# Patient Record
Sex: FEMALE | Race: BLACK OR AFRICAN AMERICAN | Employment: FULL TIME | URBAN - METROPOLITAN AREA
[De-identification: names, ages, dates, MRNs, and addresses within clinical notes are randomized per-mention and may not be internally consistent; named-entity substitution may affect disease eponyms.]

---

## 2020-02-19 ENCOUNTER — HOSPITAL ENCOUNTER (OUTPATIENT)
Dept: OCCUPATIONAL MEDICINE | Age: 25
Discharge: HOME | End: 2020-02-19
Attending: EMERGENCY MEDICINE

## 2020-02-19 DIAGNOSIS — Z02.1 PHYSICAL EXAM, PRE-EMPLOYMENT: ICD-10-CM

## 2020-11-01 ENCOUNTER — HOSPITAL ENCOUNTER (EMERGENCY)
Age: 25
Discharge: HOME | End: 2020-11-01
Attending: STUDENT IN AN ORGANIZED HEALTH CARE EDUCATION/TRAINING PROGRAM
Payer: COMMERCIAL

## 2020-11-01 VITALS
HEART RATE: 89 BPM | DIASTOLIC BLOOD PRESSURE: 89 MMHG | SYSTOLIC BLOOD PRESSURE: 144 MMHG | TEMPERATURE: 98.4 F | OXYGEN SATURATION: 99 % | BODY MASS INDEX: 45.27 KG/M2 | RESPIRATION RATE: 15 BRPM | HEIGHT: 66 IN

## 2020-11-01 DIAGNOSIS — R51.9 ACUTE NONINTRACTABLE HEADACHE, UNSPECIFIED HEADACHE TYPE: Primary | ICD-10-CM

## 2020-11-01 DIAGNOSIS — N92.6 IRREGULAR MENSES: ICD-10-CM

## 2020-11-01 LAB
ANION GAP SERPL CALC-SCNC: 2 MMOL/L (ref 3–18)
APPEARANCE UR: CLEAR
BACTERIA URNS QL MICRO: ABNORMAL /HPF
BASOPHILS # BLD: 0 K/UL (ref 0–0.1)
BASOPHILS NFR BLD: 1 % (ref 0–2)
BILIRUB UR QL: NEGATIVE
BUN SERPL-MCNC: 9 MG/DL (ref 7–18)
BUN/CREAT SERPL: 14 (ref 12–20)
CALCIUM SERPL-MCNC: 9.1 MG/DL (ref 8.5–10.1)
CHLORIDE SERPL-SCNC: 107 MMOL/L (ref 100–111)
CO2 SERPL-SCNC: 28 MMOL/L (ref 21–32)
COLOR UR: YELLOW
CREAT SERPL-MCNC: 0.64 MG/DL (ref 0.6–1.3)
DIFFERENTIAL METHOD BLD: ABNORMAL
EOSINOPHIL # BLD: 0.1 K/UL (ref 0–0.4)
EOSINOPHIL NFR BLD: 2 % (ref 0–5)
EPITH CASTS URNS QL MICRO: ABNORMAL /LPF (ref 0–5)
ERYTHROCYTE [DISTWIDTH] IN BLOOD BY AUTOMATED COUNT: 16.3 % (ref 11.6–14.5)
GLUCOSE SERPL-MCNC: 93 MG/DL (ref 74–99)
GLUCOSE UR STRIP.AUTO-MCNC: NEGATIVE MG/DL
HCG SERPL QL: NEGATIVE
HCG UR QL: NEGATIVE
HCT VFR BLD AUTO: 32.8 % (ref 35–45)
HGB BLD-MCNC: 9.3 G/DL (ref 12–16)
HGB UR QL STRIP: NEGATIVE
KETONES UR QL STRIP.AUTO: NEGATIVE MG/DL
LEUKOCYTE ESTERASE UR QL STRIP.AUTO: ABNORMAL
LYMPHOCYTES # BLD: 2.4 K/UL (ref 0.9–3.6)
LYMPHOCYTES NFR BLD: 39 % (ref 21–52)
MCH RBC QN AUTO: 20.5 PG (ref 24–34)
MCHC RBC AUTO-ENTMCNC: 28.4 G/DL (ref 31–37)
MCV RBC AUTO: 72.4 FL (ref 74–97)
MONOCYTES # BLD: 0.3 K/UL (ref 0.05–1.2)
MONOCYTES NFR BLD: 5 % (ref 3–10)
NEUTS SEG # BLD: 3.2 K/UL (ref 1.8–8)
NEUTS SEG NFR BLD: 53 % (ref 40–73)
NITRITE UR QL STRIP.AUTO: NEGATIVE
PH UR STRIP: 6 [PH] (ref 5–8)
PLATELET # BLD AUTO: 411 K/UL (ref 135–420)
PMV BLD AUTO: 9.3 FL (ref 9.2–11.8)
POTASSIUM SERPL-SCNC: 3.6 MMOL/L (ref 3.5–5.5)
PROT UR STRIP-MCNC: NEGATIVE MG/DL
RBC # BLD AUTO: 4.53 M/UL (ref 4.2–5.3)
RBC #/AREA URNS HPF: NEGATIVE /HPF (ref 0–5)
SODIUM SERPL-SCNC: 137 MMOL/L (ref 136–145)
SP GR UR REFRACTOMETRY: 1.02 (ref 1–1.03)
UROBILINOGEN UR QL STRIP.AUTO: 0.2 EU/DL (ref 0.2–1)
WBC # BLD AUTO: 6.1 K/UL (ref 4.6–13.2)
WBC URNS QL MICRO: ABNORMAL /HPF (ref 0–4)

## 2020-11-01 PROCEDURE — 84703 CHORIONIC GONADOTROPIN ASSAY: CPT

## 2020-11-01 PROCEDURE — 74011250636 HC RX REV CODE- 250/636: Performed by: STUDENT IN AN ORGANIZED HEALTH CARE EDUCATION/TRAINING PROGRAM

## 2020-11-01 PROCEDURE — 80048 BASIC METABOLIC PNL TOTAL CA: CPT

## 2020-11-01 PROCEDURE — 81025 URINE PREGNANCY TEST: CPT

## 2020-11-01 PROCEDURE — 96374 THER/PROPH/DIAG INJ IV PUSH: CPT

## 2020-11-01 PROCEDURE — 81001 URINALYSIS AUTO W/SCOPE: CPT

## 2020-11-01 PROCEDURE — 99284 EMERGENCY DEPT VISIT MOD MDM: CPT

## 2020-11-01 PROCEDURE — 85025 COMPLETE CBC W/AUTO DIFF WBC: CPT

## 2020-11-01 PROCEDURE — 74011250637 HC RX REV CODE- 250/637: Performed by: STUDENT IN AN ORGANIZED HEALTH CARE EDUCATION/TRAINING PROGRAM

## 2020-11-01 RX ORDER — ACETAMINOPHEN 325 MG/1
975 TABLET ORAL
Status: COMPLETED | OUTPATIENT
Start: 2020-11-01 | End: 2020-11-01

## 2020-11-01 RX ORDER — KETOROLAC TROMETHAMINE 15 MG/ML
15 INJECTION, SOLUTION INTRAMUSCULAR; INTRAVENOUS ONCE
Status: DISCONTINUED | OUTPATIENT
Start: 2020-11-01 | End: 2020-11-01

## 2020-11-01 RX ORDER — ONDANSETRON 2 MG/ML
4 INJECTION INTRAMUSCULAR; INTRAVENOUS
Status: COMPLETED | OUTPATIENT
Start: 2020-11-01 | End: 2020-11-01

## 2020-11-01 RX ADMIN — ONDANSETRON 4 MG: 2 INJECTION INTRAMUSCULAR; INTRAVENOUS at 21:47

## 2020-11-01 RX ADMIN — SODIUM CHLORIDE 1000 ML: 900 INJECTION, SOLUTION INTRAVENOUS at 21:47

## 2020-11-01 RX ADMIN — ACETAMINOPHEN 975 MG: 325 TABLET, FILM COATED ORAL at 21:47

## 2020-11-01 NOTE — LETTER
[facility] EMERGENCY DEPT 
[address] 
[address] [facility] [id number] [phone] Work/School Note Date: 11/1/2020 To Whom It May concern: 
 
 
Razia Silverio was seen and treated today in the emergency room by the following provider(s): 
Attending Provider: Kerrie Caban DO. Razia Silverio is excused from work/school on 11/01/20. She is clear to return to work/school on 11/02/20.    
 
 
Sincerely, 
 
 
 
 
Demian Bergeron DO

## 2020-11-02 NOTE — ED PROVIDER NOTES
82-year-old female with past medical history significant for iron deficiency anemia, ovarian cyst and asthma presents to the ED for complaint of intermittent frontal headache for the past 2 days. It started while she was at work and gradually worsened lasted approximately 2 hours and then resolved on its own. She took some Tylenol yesterday that helped with the pain. She denies any head trauma, visual changes, fever, chills, chest pain, shortness of breath or abdominal pain. She does endorse some nausea but no vomiting. She is also concerned that she may be pregnant because she has not had a menstrual period since August of this year. She received a Depo shot in May and has had irregular menses since then. She is sexually active with one partner and does not use protection. She denies any dysuria, increased urinary frequency, urgency, abnormal vaginal discharge or any other complaints. She denies any smoking, drinking or recreational drug use. Past Medical History:   Diagnosis Date    Anemia     Asthma        History reviewed. No pertinent surgical history. History reviewed. No pertinent family history.     Social History     Socioeconomic History    Marital status: SINGLE     Spouse name: Not on file    Number of children: Not on file    Years of education: Not on file    Highest education level: Not on file   Occupational History    Not on file   Social Needs    Financial resource strain: Not on file    Food insecurity     Worry: Not on file     Inability: Not on file    Transportation needs     Medical: Not on file     Non-medical: Not on file   Tobacco Use    Smoking status: Never Smoker    Smokeless tobacco: Current User   Substance and Sexual Activity    Alcohol use: Yes     Frequency: Never     Comment: occassional     Drug use: Never    Sexual activity: Not on file   Lifestyle    Physical activity     Days per week: Not on file     Minutes per session: Not on file    Stress: Not on file   Relationships    Social connections     Talks on phone: Not on file     Gets together: Not on file     Attends Quaker service: Not on file     Active member of club or organization: Not on file     Attends meetings of clubs or organizations: Not on file     Relationship status: Not on file    Intimate partner violence     Fear of current or ex partner: Not on file     Emotionally abused: Not on file     Physically abused: Not on file     Forced sexual activity: Not on file   Other Topics Concern    Not on file   Social History Narrative    Not on file         ALLERGIES: Patient has no known allergies. Review of Systems   Constitutional: Negative for activity change and appetite change. HENT: Negative for drooling and facial swelling. Eyes: Negative for pain and discharge. Respiratory: Negative for apnea and choking. Cardiovascular: Negative for palpitations and leg swelling. Gastrointestinal: Positive for nausea. Negative for blood in stool and rectal pain. Endocrine: Negative for polydipsia and polyphagia. Genitourinary: Positive for menstrual problem. Negative for genital sores and hematuria. Musculoskeletal: Negative for gait problem and neck stiffness. Skin: Negative for color change and rash. Allergic/Immunologic: Negative for environmental allergies. Neurological: Positive for headaches. Negative for tremors. Hematological: Negative for adenopathy. Psychiatric/Behavioral: Negative for agitation and behavioral problems. Vitals:    11/01/20 2047   BP: (!) 144/89   Pulse: 89   Resp: 15   Temp: 98.4 °F (36.9 °C)   SpO2: 99%   Height: 5' 6\" (1.676 m)            Physical Exam  Vitals signs and nursing note reviewed. Constitutional:       Appearance: She is obese. HENT:      Head: Normocephalic and atraumatic. Eyes:      Extraocular Movements: Extraocular movements intact. Pupils: Pupils are equal, round, and reactive to light. Cardiovascular:      Rate and Rhythm: Normal rate and regular rhythm. Heart sounds: Normal heart sounds. No murmur. No friction rub. Pulmonary:      Effort: Pulmonary effort is normal.      Breath sounds: Normal breath sounds. Abdominal:      Palpations: Abdomen is soft. Tenderness: There is no abdominal tenderness. There is no right CVA tenderness or left CVA tenderness. Musculoskeletal: Normal range of motion. Skin:     General: Skin is warm and dry. Capillary Refill: Capillary refill takes less than 2 seconds. Neurological:      General: No focal deficit present. Mental Status: She is alert and oriented to person, place, and time. Comments: CN II-XII intact, normal finger-to-nose, no pronator drift, negative Romberg sign, strength 5/5 and sensation intact in all extremities, ambulatory with a steady gait   Psychiatric:         Mood and Affect: Mood normal.          MDM  Number of Diagnoses or Management Options  Acute nonintractable headache, unspecified headache type:   Irregular menses:   Diagnosis management comments: 47-year-old female here with mild headache and irregular menses. Her neurological exam is completely normal and she denies any history of head trauma. No indication to pursue neuroimaging at this time. Will perform basic laboratory work-up including UA and pregnancy test.  IV fluid bolus. Tylenol for pain control. Zofran for nausea. Will reassess. Laboratory work-up is significant for anemia with a hemoglobin of 9.3. This is chronic and unchanged from baseline. UA negative for UTI. Pregnancy test negative. Patient's headache has completely resolved. Patient states that she recently moved to [place] and needs a gynecologist that she can follow-up with. Information for the OB/GYN clinic at [facility] written on her discharge paperwork. She is stable for discharge home. Pt has been reexamined.  Patient has no new complaints, changes, or physical findings. Care plan outlined and precautions discussed. Results were reviewed with the patient. All medications were reviewed with the patient; will d/c home with PMD f/u. All of pt's questions and concerns were addressed. Patient was instructed and agrees to follow up with PMD, as well as to return to the ED upon further deterioration. Patient is ready to go home. This note was dictated utilizing voice recognition software which may lead to typographical errors.  I apologize in advance if the situation occurs.  If questions arise please do not hesitate to contact me or call our department.     Dahiana Hammer, DO           Procedures

## 2020-11-02 NOTE — ED TRIAGE NOTES
Ambulatory to triage. C/o blood pressure of 142/84 at work that she was concerned about. Also states she hasn't had a period since August and has not taken an at home pregnancy test. \"I have low hormone levels and my pregnancies were never detected. \" Nausea x6 weeks.

## 2020-11-02 NOTE — ED NOTES
Medicated per mar order for pain and nausea. Fluids started through IV in RAC infusing without complications. Call bell within reach.

## 2021-03-28 ENCOUNTER — HOSPITAL ENCOUNTER (EMERGENCY)
Age: 26
Discharge: HOME | End: 2021-03-28
Attending: EMERGENCY MEDICINE
Payer: COMMERCIAL

## 2021-03-28 VITALS
DIASTOLIC BLOOD PRESSURE: 68 MMHG | HEART RATE: 81 BPM | SYSTOLIC BLOOD PRESSURE: 121 MMHG | OXYGEN SATURATION: 99 % | TEMPERATURE: 98.1 F | RESPIRATION RATE: 18 BRPM

## 2021-03-28 DIAGNOSIS — R13.10 ODYNOPHAGIA: ICD-10-CM

## 2021-03-28 DIAGNOSIS — T78.40XA ALLERGIC REACTION, INITIAL ENCOUNTER: Primary | ICD-10-CM

## 2021-03-28 LAB
ALBUMIN SERPL-MCNC: 3.8 G/DL (ref 3.4–5)
ALBUMIN/GLOB SERPL: 0.9 {RATIO} (ref 0.8–1.7)
ALP SERPL-CCNC: 139 U/L (ref 45–117)
ALT SERPL-CCNC: 44 U/L (ref 13–56)
ANION GAP SERPL CALC-SCNC: 4 MMOL/L (ref 3–18)
AST SERPL-CCNC: 30 U/L (ref 10–38)
BASOPHILS # BLD: 0 K/UL (ref 0–0.1)
BASOPHILS NFR BLD: 0 % (ref 0–2)
BILIRUB SERPL-MCNC: 0.5 MG/DL (ref 0.2–1)
BUN SERPL-MCNC: 11 MG/DL (ref 7–18)
BUN/CREAT SERPL: 19 (ref 12–20)
CALCIUM SERPL-MCNC: 9.1 MG/DL (ref 8.5–10.1)
CHLORIDE SERPL-SCNC: 104 MMOL/L (ref 100–111)
CO2 SERPL-SCNC: 27 MMOL/L (ref 21–32)
CREAT SERPL-MCNC: 0.57 MG/DL (ref 0.6–1.3)
DIFFERENTIAL METHOD BLD: ABNORMAL
EOSINOPHIL # BLD: 0.2 K/UL (ref 0–0.4)
EOSINOPHIL NFR BLD: 2 % (ref 0–5)
ERYTHROCYTE [DISTWIDTH] IN BLOOD BY AUTOMATED COUNT: 15 % (ref 11.6–14.5)
GLOBULIN SER CALC-MCNC: 4.4 G/DL (ref 2–4)
GLUCOSE SERPL-MCNC: 97 MG/DL (ref 74–99)
HCG SERPL QL: NEGATIVE
HCT VFR BLD AUTO: 35.9 % (ref 35–45)
HGB BLD-MCNC: 10.2 G/DL (ref 12–16)
LYMPHOCYTES # BLD: 2.3 K/UL (ref 0.9–3.6)
LYMPHOCYTES NFR BLD: 30 % (ref 21–52)
MCH RBC QN AUTO: 22.2 PG (ref 24–34)
MCHC RBC AUTO-ENTMCNC: 28.4 G/DL (ref 31–37)
MCV RBC AUTO: 78.2 FL (ref 74–97)
MONOCYTES # BLD: 0.5 K/UL (ref 0.05–1.2)
MONOCYTES NFR BLD: 7 % (ref 3–10)
NEUTS SEG # BLD: 4.6 K/UL (ref 1.8–8)
NEUTS SEG NFR BLD: 61 % (ref 40–73)
PLATELET # BLD AUTO: 392 K/UL (ref 135–420)
PMV BLD AUTO: 9.6 FL (ref 9.2–11.8)
POTASSIUM SERPL-SCNC: 4.1 MMOL/L (ref 3.5–5.5)
PROT SERPL-MCNC: 8.2 G/DL (ref 6.4–8.2)
RBC # BLD AUTO: 4.59 M/UL (ref 4.2–5.3)
SODIUM SERPL-SCNC: 135 MMOL/L (ref 136–145)
WBC # BLD AUTO: 7.6 K/UL (ref 4.6–13.2)

## 2021-03-28 PROCEDURE — 84703 CHORIONIC GONADOTROPIN ASSAY: CPT

## 2021-03-28 PROCEDURE — 99284 EMERGENCY DEPT VISIT MOD MDM: CPT

## 2021-03-28 PROCEDURE — 74011250636 HC RX REV CODE- 250/636: Performed by: EMERGENCY MEDICINE

## 2021-03-28 PROCEDURE — 85025 COMPLETE CBC W/AUTO DIFF WBC: CPT

## 2021-03-28 PROCEDURE — 96375 TX/PRO/DX INJ NEW DRUG ADDON: CPT

## 2021-03-28 PROCEDURE — 96374 THER/PROPH/DIAG INJ IV PUSH: CPT

## 2021-03-28 PROCEDURE — 80053 COMPREHEN METABOLIC PANEL: CPT

## 2021-03-28 RX ORDER — DIPHENHYDRAMINE HYDROCHLORIDE 50 MG/ML
50 INJECTION, SOLUTION INTRAMUSCULAR; INTRAVENOUS
Status: COMPLETED | OUTPATIENT
Start: 2021-03-28 | End: 2021-03-28

## 2021-03-28 RX ORDER — FAMOTIDINE 10 MG/ML
20 INJECTION INTRAVENOUS
Status: COMPLETED | OUTPATIENT
Start: 2021-03-28 | End: 2021-03-28

## 2021-03-28 RX ORDER — PREDNISONE 20 MG/1
40 TABLET ORAL DAILY
Qty: 8 TAB | Refills: 0 | Status: SHIPPED | OUTPATIENT
Start: 2021-03-28 | End: 2021-04-01

## 2021-03-28 RX ORDER — LORATADINE 10 MG
10 TABLET,DISINTEGRATING ORAL
Qty: 10 TAB | Refills: 0 | Status: SHIPPED | OUTPATIENT
Start: 2021-03-28 | End: 2021-04-07

## 2021-03-28 RX ORDER — EPINEPHRINE 0.3 MG/.3ML
0.3 INJECTION SUBCUTANEOUS
Qty: 1 SYRINGE | Refills: 0 | Status: SHIPPED | OUTPATIENT
Start: 2021-03-28 | End: 2021-03-28

## 2021-03-28 RX ADMIN — METHYLPREDNISOLONE SODIUM SUCCINATE 125 MG: 125 INJECTION, POWDER, FOR SOLUTION INTRAMUSCULAR; INTRAVENOUS at 09:50

## 2021-03-28 RX ADMIN — FAMOTIDINE 20 MG: 10 INJECTION, SOLUTION INTRAVENOUS at 09:50

## 2021-03-28 RX ADMIN — DIPHENHYDRAMINE HYDROCHLORIDE 50 MG: 50 INJECTION, SOLUTION INTRAMUSCULAR; INTRAVENOUS at 09:50

## 2021-03-28 RX ADMIN — SODIUM CHLORIDE 1000 ML: 9 INJECTION, SOLUTION INTRAVENOUS at 09:50

## 2021-03-28 NOTE — Clinical Note
[facility] EMERGENCY DEPT 
[address] 
300 [facility] [id number] [phone] Work/School Note Date: 3/28/2021 To Whom It May concern: 
 
Dickie Goldmann was seen and treated today in the emergency room by the following provider(s): 
Attending Provider: Vianey Alvarado DO. Dickie Goldmann is excused from work/school on 03/28/21 and 03/29/21. She is medically clear to return to work/school on 3/30/2021.   
 
 
Sincerely, 
 
 
 
 
Nikolasy Abler, DO

## 2021-03-28 NOTE — DISCHARGE INSTRUCTIONS
Take the prednisone until it is gone. Take the Claritin once per day as needed. You may also take Benadryl however may cause drowsiness. I have given you primary care resources as well as resources for an allergist.    If your symptoms worsen or you have any increased swelling you can use the EpiPen however you must seek medical attention right away. If your symptoms worsen or change in any way return or seek medical attention.

## 2021-03-28 NOTE — ED TRIAGE NOTES
Pt reports a known allergy to peanuts and states she accidentally took a bite of a protein bar 3 days ago that had peanut butter in it. Pt states she started to fell shortness of breath and difficulty swallowing about 1 hour later that has swati getting progressively worse over the last 3 days.

## 2021-03-28 NOTE — ED PROVIDER NOTES
EMERGENCY DEPARTMENT HISTORY AND PHYSICAL EXAM    9:23 AM    Date: 3/28/2021  Patient Name: Bella Ford    History of Presenting Illness     Chief Complaint   Patient presents with    Allergic Reaction       History Provided By: Patient  Location/Duration/Severity/Modifying factors   Patient is a 29-year-old female who presents to the emergency department with a chief complaint of difficulty swallowing after she ate peanuts. Patient reports that 2 days ago she had peanuts, she has a known allergy to peanuts, shortly thereafter she had nasal congestion and pain in her throat difficulty swallowing. She reports that it worsened this morning and she opted to come be seen. She is taking several different allergy medicines. Reports he is at difficulty swallowing liquids or anything however she did note that she did take some allergy medicine orally this morning. Previously had an EpiPen but does not have one currently. No fevers or chills, history of asthma but breathing feels like it is at baseline at this time. Denies any headache. Unknown if pregnant. Nothing really seems to make her symptoms better or worse. Patient reports she last took her allergy medicine about 4 hours ago. No rash that she is aware of. Symptoms originally onset about an hour after eating the peanuts. PCP: None    Current Outpatient Medications   Medication Sig Dispense Refill    EPINEPHrine (EPIPEN) 0.3 mg/0.3 mL injection 0.3 mL by IntraMUSCular route once as needed for Anaphylaxis for up to 1 dose. 1 Syringe 0    predniSONE (DELTASONE) 20 mg tablet Take 40 mg by mouth daily for 4 days. With Breakfast 8 Tab 0    loratadine (CLARITIN REDITABS) 10 mg dissolvable tablet Take 1 Tab by mouth daily as needed for Allergies for up to 10 days.  10 Tab 0       Past History     Past Medical History:  Past Medical History:   Diagnosis Date    Anemia     Asthma        Past Surgical History:  No past surgical history on file.    Family History:  No family history on file. Social History:  Social History     Tobacco Use    Smoking status: Never Smoker    Smokeless tobacco: Current User   Substance Use Topics    Alcohol use: Yes     Frequency: Never     Comment: occassional     Drug use: Never       Allergies:  No Known Allergies    I reviewed and confirmed the above information with patient and updated as necessary. Review of Systems     Review of Systems   Constitutional: Negative for chills and fever. HENT: Positive for sore throat and trouble swallowing. Negative for congestion, rhinorrhea, sinus pressure and sneezing. Eyes: Positive for itching. Negative for visual disturbance. Respiratory: Negative for cough and shortness of breath. Cardiovascular: Negative for chest pain. Gastrointestinal: Negative for abdominal pain, diarrhea, nausea and vomiting. Genitourinary: Negative for dysuria, frequency and urgency. Musculoskeletal: Negative for back pain and neck pain. Skin: Negative for rash. Neurological: Negative for syncope, numbness and headaches. Physical Exam     Visit Vitals  /68   Pulse 81   Temp 98.1 °F (36.7 °C)   Resp 18   SpO2 99%       Physical Exam  Vitals signs and nursing note reviewed. Constitutional:       General: She is not in acute distress. Appearance: Normal appearance. She is normal weight. She is not ill-appearing or toxic-appearing. HENT:      Head: Normocephalic and atraumatic. Right Ear: External ear normal.      Left Ear: External ear normal.      Nose: Congestion present. Mouth/Throat:      Mouth: Mucous membranes are moist.      Comments: Mild pharyngeal erythema, borderline uvula swelling. No tonsillar deviation or peritonsillar abscess. No trismus, swallowing secretions without difficulty. Eyes:      Conjunctiva/sclera: Conjunctivae normal.      Pupils: Pupils are equal, round, and reactive to light.    Neck:      Musculoskeletal: Normal range of motion and neck supple. Cardiovascular:      Rate and Rhythm: Normal rate and regular rhythm. Pulses: Normal pulses. Heart sounds: Normal heart sounds. No murmur. Pulmonary:      Effort: Pulmonary effort is normal.      Breath sounds: Normal breath sounds. No wheezing, rhonchi or rales. Abdominal:      General: Abdomen is flat. Tenderness: There is no abdominal tenderness. There is no guarding or rebound. Musculoskeletal: Normal range of motion. General: No swelling or tenderness. Right lower leg: No edema. Left lower leg: No edema. Skin:     General: Skin is warm and dry. Capillary Refill: Capillary refill takes less than 2 seconds. Findings: No rash. Neurological:      General: No focal deficit present. Mental Status: She is alert. Diagnostic Study Results     Labs -  Recent Results (from the past 24 hour(s))   CBC WITH AUTOMATED DIFF    Collection Time: 03/28/21  9:25 AM   Result Value Ref Range    WBC 7.6 4.6 - 13.2 K/uL    RBC 4.59 4.20 - 5.30 M/uL    HGB 10.2 (L) 12.0 - 16.0 g/dL    HCT 35.9 35.0 - 45.0 %    MCV 78.2 74.0 - 97.0 FL    MCH 22.2 (L) 24.0 - 34.0 PG    MCHC 28.4 (L) 31.0 - 37.0 g/dL    RDW 15.0 (H) 11.6 - 14.5 %    PLATELET 306 485 - 651 K/uL    MPV 9.6 9.2 - 11.8 FL    NEUTROPHILS 61 40 - 73 %    LYMPHOCYTES 30 21 - 52 %    MONOCYTES 7 3 - 10 %    EOSINOPHILS 2 0 - 5 %    BASOPHILS 0 0 - 2 %    ABS. NEUTROPHILS 4.6 1.8 - 8.0 K/UL    ABS. LYMPHOCYTES 2.3 0.9 - 3.6 K/UL    ABS. MONOCYTES 0.5 0.05 - 1.2 K/UL    ABS. EOSINOPHILS 0.2 0.0 - 0.4 K/UL    ABS.  BASOPHILS 0.0 0.0 - 0.1 K/UL    DF AUTOMATED     METABOLIC PANEL, COMPREHENSIVE    Collection Time: 03/28/21  9:25 AM   Result Value Ref Range    Sodium 135 (L) 136 - 145 mmol/L    Potassium 4.1 3.5 - 5.5 mmol/L    Chloride 104 100 - 111 mmol/L    CO2 27 21 - 32 mmol/L    Anion gap 4 3.0 - 18 mmol/L    Glucose 97 74 - 99 mg/dL    BUN 11 7.0 - 18 MG/DL    Creatinine 0.57 (L) 0.6 - 1.3 MG/DL    BUN/Creatinine ratio 19 12 - 20      GFR est AA >60 >60 ml/min/1.73m2    GFR est non-AA >60 >60 ml/min/1.73m2    Calcium 9.1 8.5 - 10.1 MG/DL    Bilirubin, total 0.5 0.2 - 1.0 MG/DL    ALT (SGPT) 44 13 - 56 U/L    AST (SGOT) 30 10 - 38 U/L    Alk. phosphatase 139 (H) 45 - 117 U/L    Protein, total 8.2 6.4 - 8.2 g/dL    Albumin 3.8 3.4 - 5.0 g/dL    Globulin 4.4 (H) 2.0 - 4.0 g/dL    A-G Ratio 0.9 0.8 - 1.7     HCG QL SERUM    Collection Time: 03/28/21  9:25 AM   Result Value Ref Range    HCG, Ql. Negative NEG           Radiologic Studies -   No orders to display           Medical Decision Making   I am the first provider for this patient. I reviewed the vital signs, available nursing notes, past medical history, past surgical history, family history and social history. Vital Signs-Reviewed the patient's vital signs. Records Reviewed: Nursing Notes and Old Medical Records (Time of Review: 9:23 AM)    ED Course: Progress Notes, Reevaluation, and Consults:  ED Course as of Mar 28 1238   Sun Mar 28, 2021   1221 Patient reassessed. Resting comfortably. She is able to tolerate oral fluids in my presence. Offered CT imaging now to rule out abscess or other airway obstruction however she has no stridor, has no drooling or trismus. She prefers trial of prednisone. We will also give her EpiPen. She reports that this is similar to prior episodes of reaction to peanut butter where she has more delayed response. She tells me a story of when she tried peanut butter chocolate candy that resulted in 3 or 4 days of similar symptoms.     [NIYAH]      ED Course User Index  [NIYAH] Irma Stokes DO         Provider Notes (Medical Decision Making):   MDM  Number of Diagnoses or Management Options  Allergic reaction, initial encounter  Odynophagia  Diagnosis management comments: Patient is a 77-year-old female with known peanut allergy and asthma who presents with a chief complaint difficulty swallowing 2 days after exposure to peanuts. Patient reports mild reaction at first and then has seemingly had slow progression over the past few days. Differential includes angioedema, anaphylaxis, allergic reaction, also consider primary pharyngitis, peritonsillar abscess, retropharyngeal abscess, etc.    We will initially treat her like an allergic reaction with steroids, Benadryl and Pepcid. Certainly if she worsens or does not improve as expected may need to give her epinephrine, if she has no improvement with this management will need to look for other causes and probably do a CT. Results reviewed: Patient demonstrated steady improvement during her course. She is able to tolerate oral fluids. She has no trismus, no drooling. Voice actually improved as well and she is less congested. Suspect allergic etiology. Offered CT given she was 900% at baseline she declines wants to do steroids at home. Advised return if symptoms are worsening or changing in the meantime. At this time, patient is stable and appropriate for discharge home.  Patient demonstrates understanding of current diagnoses and is in agreement with the treatment plan. Shalini Negreteon are advised that while the likelihood of serious underlying condition is low at this point given the evaluation performed today, we cannot fully rule it out. Karron Maxx are advised to immediately return with any new symptoms or worsening of current condition.  All questions have been answered. Debbie Cline is given educational material regarding their diagnoses, including danger symptoms and when to return to the ED. This note was dictated utilizing Dragon voice recognition software. Unfortunately this leads to occasional typographical errors. I apologize in advance if the situation occurs. If questions occur please do not hesitate to contact me directly. DO Panda Marshall        Diagnosis     Clinical Impression:   1. Allergic reaction, initial encounter    2. Odynophagia        Disposition: Discharge    Follow-up Information     Follow up With Specialties Details Why Contact Info    [facility] EMERGENCY DEPT Emergency Medicine  If symptoms worsen, As needed [address] [facility] [address]    HR ALLERGY Allergy   [address] [facility] [address]  [place] [address]  [address] [facility]. Jeyson Rice MD Allergy   [address] [facility] [address] [facility]  [phone]      Your Primary Care Physician  In 3 days      Wander Damon MD Geriatric Medicine In 3 days [facility] [address]  [address] [facility] [address] [place] Diane MD Internal Medicine In 3 days  [facility] [address] [place] [address] [facility] Internal Medicine [address] [facility] [address] [phone]             Patient's Medications   Start Taking    EPINEPHRINE (EPIPEN) 0.3 MG/0.3 ML INJECTION    0.3 mL by IntraMUSCular route once as needed for Anaphylaxis for up to 1 dose. LORATADINE (CLARITIN REDITABS) 10 MG DISSOLVABLE TABLET    Take 1 Tab by mouth daily as needed for Allergies for up to 10 days. PREDNISONE (DELTASONE) 20 MG TABLET    Take 40 mg by mouth daily for 4 days. With Breakfast   Continue Taking    No medications on file   These Medications have changed    No medications on file   Stop Taking    No medications on file       Fadia Zazueta DO   Emergency Medicine   March 28, 2021, 9:23 AM     This note is dictated utilizing Dragon voice recognition software. Unfortunately this leads to occasional typographical errors using the voice recognition. I apologize in advance if the situation occurs. If questions occur please do not hesitate to contact me directly.     Fadia Zazueta, DO

## 2021-07-14 NOTE — ED NOTES
Addended by: CHASITY NUNEZ on: 7/14/2021 04:10 PM     Modules accepted: Orders     Urine sent to lab. BP monitor in place. Pt also has complaints of HA to front of head rating it at an 8. Will make provider aware of HA. Call bell within reach. Will continue to monitor.